# Patient Record
Sex: MALE | Race: ASIAN
[De-identification: names, ages, dates, MRNs, and addresses within clinical notes are randomized per-mention and may not be internally consistent; named-entity substitution may affect disease eponyms.]

---

## 2021-07-02 ENCOUNTER — HOSPITAL ENCOUNTER (EMERGENCY)
Dept: HOSPITAL 26 - MED | Age: 66
Discharge: HOME | End: 2021-07-02
Payer: COMMERCIAL

## 2021-07-02 VITALS — DIASTOLIC BLOOD PRESSURE: 60 MMHG | SYSTOLIC BLOOD PRESSURE: 118 MMHG

## 2021-07-02 VITALS — BODY MASS INDEX: 20.04 KG/M2 | HEIGHT: 70 IN | WEIGHT: 140 LBS

## 2021-07-02 VITALS — DIASTOLIC BLOOD PRESSURE: 62 MMHG | SYSTOLIC BLOOD PRESSURE: 121 MMHG

## 2021-07-02 DIAGNOSIS — E86.0: Primary | ICD-10-CM

## 2021-07-02 DIAGNOSIS — I10: ICD-10-CM

## 2021-07-02 DIAGNOSIS — Z95.0: ICD-10-CM

## 2021-07-02 DIAGNOSIS — E11.65: ICD-10-CM

## 2021-07-02 LAB
ANION GAP SERPL CALCULATED.3IONS-SCNC: 15 MMOL/L (ref 8–16)
BASOPHILS # BLD AUTO: 0 K/UL (ref 0–0.22)
BASOPHILS NFR BLD AUTO: 0.6 % (ref 0–2)
BUN SERPL-MCNC: 40 MG/DL (ref 7–18)
CHLORIDE SERPL-SCNC: 103 MMOL/L (ref 98–107)
CO2 SERPL-SCNC: 24 MMOL/L (ref 21–32)
CREAT SERPL-MCNC: 1.3 MG/DL (ref 0.6–1.3)
EOSINOPHIL # BLD AUTO: 0.1 K/UL (ref 0–0.4)
EOSINOPHIL NFR BLD AUTO: 2 % (ref 0–4)
ERYTHROCYTE [DISTWIDTH] IN BLOOD BY AUTOMATED COUNT: 17.6 % (ref 11.6–13.7)
GFR SERPL CREATININE-BSD FRML MDRD: 71 ML/MIN (ref 90–?)
GLUCOSE SERPL-MCNC: 387 MG/DL (ref 74–106)
HCT VFR BLD AUTO: 35.6 % (ref 36–52)
HGB BLD-MCNC: 11.4 G/DL (ref 12–18)
LYMPHOCYTES # BLD AUTO: 1 K/UL (ref 2–11.5)
LYMPHOCYTES NFR BLD AUTO: 14.9 % (ref 20.5–51.1)
MCH RBC QN AUTO: 29 PG (ref 27–31)
MCHC RBC AUTO-ENTMCNC: 32 G/DL (ref 33–37)
MCV RBC AUTO: 89.2 FL (ref 80–94)
MONOCYTES # BLD AUTO: 1.1 K/UL (ref 0.8–1)
MONOCYTES NFR BLD AUTO: 16.4 % (ref 1.7–9.3)
NEUTROPHILS # BLD AUTO: 4.4 K/UL (ref 1.8–7.7)
NEUTROPHILS NFR BLD AUTO: 66.1 % (ref 42.2–75.2)
PLATELET # BLD AUTO: 196 K/UL (ref 140–450)
POTASSIUM SERPL-SCNC: 5 MMOL/L (ref 3.5–5.1)
RBC # BLD AUTO: 3.99 MIL/UL (ref 4.2–6.1)
SODIUM SERPL-SCNC: 137 MMOL/L (ref 136–145)
WBC # BLD AUTO: 6.7 K/UL (ref 4.8–10.8)

## 2021-07-02 PROCEDURE — 99284 EMERGENCY DEPT VISIT MOD MDM: CPT

## 2021-07-02 PROCEDURE — 84484 ASSAY OF TROPONIN QUANT: CPT

## 2021-07-02 PROCEDURE — 36415 COLL VENOUS BLD VENIPUNCTURE: CPT

## 2021-07-02 PROCEDURE — 85025 COMPLETE CBC W/AUTO DIFF WBC: CPT

## 2021-07-02 PROCEDURE — 71045 X-RAY EXAM CHEST 1 VIEW: CPT

## 2021-07-02 PROCEDURE — 96372 THER/PROPH/DIAG INJ SC/IM: CPT

## 2021-07-02 PROCEDURE — 96360 HYDRATION IV INFUSION INIT: CPT

## 2021-07-02 PROCEDURE — 80048 BASIC METABOLIC PNL TOTAL CA: CPT

## 2021-07-02 PROCEDURE — 93005 ELECTROCARDIOGRAM TRACING: CPT

## 2021-07-02 NOTE — NUR
67 Y/O MALE PT BIBA FROM THE STREETS C/O GENERALIZED WEAKNESS AND HYPERGLYCEMIA 
X1DAY. PT STATES HE HAS 5/10 DESCRIBES AS ACHING GENERALIZED BODY PAIN. PT WAS 
INVOLVED IN MINOR TC DUE TO BECOMING WEAK. DENIES LOC. DENIES N/V, DENIES 
FEVER/CHILLS. 



PMH: HTN, DM, PACEMAKER



NKA

## 2021-07-02 NOTE — NUR
PK=[id number]. DR. GREENBERG ORDERED TO GIVE 5U HUMULIN R AND METFORMIN 1000MG. GAVE 
MEDICATIONS AS ORDERED. WILL CONT. TO MONITOR.

## 2021-07-02 NOTE — NUR
Patient discharged with v/s stable. Written and verbal after care instructions 
given RE: HYPERGLYCEMIA and explained. 

Patient verbalized understanding. Ambulatory with steady gait. All questions 
addressed prior to discharge. Advised to follow up with PMD.

## 2021-07-02 NOTE — NUR
REPEAT [id number] AFTER HUMULIN R AND METFORMIN 1000MG. PATIENT APPEARS STABLE AND 
IN NO SIGNS OF ANY DISTRESS.